# Patient Record
Sex: FEMALE | Race: WHITE | NOT HISPANIC OR LATINO | URBAN - METROPOLITAN AREA
[De-identification: names, ages, dates, MRNs, and addresses within clinical notes are randomized per-mention and may not be internally consistent; named-entity substitution may affect disease eponyms.]

---

## 2023-12-15 ENCOUNTER — OFFICE (OUTPATIENT)
Dept: URBAN - METROPOLITAN AREA CLINIC 27 | Facility: CLINIC | Age: 73
End: 2023-12-15

## 2023-12-15 VITALS
SYSTOLIC BLOOD PRESSURE: 133 MMHG | HEART RATE: 80 BPM | TEMPERATURE: 98.7 F | HEIGHT: 61 IN | WEIGHT: 142 LBS | DIASTOLIC BLOOD PRESSURE: 75 MMHG

## 2023-12-15 DIAGNOSIS — D50.9 IRON DEFICIENCY ANEMIA, UNSPECIFIED: ICD-10-CM

## 2023-12-15 PROCEDURE — 99214 OFFICE O/P EST MOD 30 MIN: CPT | Performed by: INTERNAL MEDICINE

## 2023-12-19 DIAGNOSIS — L20.89 OTHER ATOPIC DERMATITIS: Primary | ICD-10-CM

## 2023-12-19 RX ORDER — DUPILUMAB 300 MG/2ML
300 INJECTION, SOLUTION SUBCUTANEOUS
Qty: 4 ML | Refills: 0 | Status: SHIPPED | OUTPATIENT
Start: 2023-12-19 | End: 2023-12-21 | Stop reason: SDUPTHER

## 2023-12-19 RX ORDER — DUPILUMAB 300 MG/2ML
300 INJECTION, SOLUTION SUBCUTANEOUS
COMMUNITY
Start: 2021-01-29 | End: 2023-12-19 | Stop reason: SDUPTHER

## 2023-12-20 PROBLEM — L20.9 ATOPIC DERMATITIS: Status: ACTIVE | Noted: 2023-12-20

## 2023-12-21 ENCOUNTER — SPECIALTY PHARMACY (OUTPATIENT)
Dept: PHARMACY | Facility: CLINIC | Age: 73
End: 2023-12-21

## 2023-12-21 ENCOUNTER — OFFICE VISIT (OUTPATIENT)
Dept: ALLERGY | Facility: CLINIC | Age: 73
End: 2023-12-21
Payer: MEDICARE

## 2023-12-21 VITALS — HEART RATE: 77 BPM | OXYGEN SATURATION: 98 % | DIASTOLIC BLOOD PRESSURE: 68 MMHG | SYSTOLIC BLOOD PRESSURE: 116 MMHG

## 2023-12-21 DIAGNOSIS — L20.89 OTHER ATOPIC DERMATITIS: ICD-10-CM

## 2023-12-21 DIAGNOSIS — L20.9 ATOPIC DERMATITIS, UNSPECIFIED TYPE: Primary | ICD-10-CM

## 2023-12-21 PROCEDURE — 1036F TOBACCO NON-USER: CPT | Performed by: ALLERGY & IMMUNOLOGY

## 2023-12-21 PROCEDURE — 1159F MED LIST DOCD IN RCRD: CPT | Performed by: ALLERGY & IMMUNOLOGY

## 2023-12-21 PROCEDURE — 99214 OFFICE O/P EST MOD 30 MIN: CPT | Performed by: ALLERGY & IMMUNOLOGY

## 2023-12-21 PROCEDURE — 1160F RVW MEDS BY RX/DR IN RCRD: CPT | Performed by: ALLERGY & IMMUNOLOGY

## 2023-12-21 PROCEDURE — 1125F AMNT PAIN NOTED PAIN PRSNT: CPT | Performed by: ALLERGY & IMMUNOLOGY

## 2023-12-21 RX ORDER — BUDESONIDE 0.25 MG/2ML
INHALANT ORAL
COMMUNITY

## 2023-12-21 RX ORDER — AA/PROT/LYSINE/METHIO/VIT C/B6 50-12.5 MG
TABLET ORAL
COMMUNITY
Start: 2016-12-15

## 2023-12-21 RX ORDER — FERROUS SULFATE 325(65) MG
TABLET ORAL
COMMUNITY
Start: 2017-07-26

## 2023-12-21 RX ORDER — FUROSEMIDE 20 MG/1
TABLET ORAL
COMMUNITY
Start: 2016-12-01

## 2023-12-21 RX ORDER — METOPROLOL SUCCINATE 25 MG/1
TABLET, EXTENDED RELEASE ORAL
COMMUNITY
Start: 2023-01-03

## 2023-12-21 RX ORDER — MYCOPHENOLATE MOFETIL 500 MG/1
TABLET ORAL
COMMUNITY
Start: 2023-10-02 | End: 2024-06-06 | Stop reason: ALTCHOICE

## 2023-12-21 RX ORDER — LEVOTHYROXINE SODIUM 50 UG/1
50 TABLET ORAL
COMMUNITY
Start: 2017-08-10

## 2023-12-21 RX ORDER — MECLIZINE HCL 12.5 MG 12.5 MG/1
TABLET ORAL
COMMUNITY
Start: 2018-06-18

## 2023-12-21 RX ORDER — HYDROXYCHLOROQUINE SULFATE 200 MG/1
TABLET, FILM COATED ORAL EVERY 24 HOURS
COMMUNITY
Start: 2018-01-30

## 2023-12-21 RX ORDER — DESONIDE 0.5 MG/G
CREAM TOPICAL 2 TIMES DAILY
Qty: 60 G | Refills: 3 | Status: SHIPPED | OUTPATIENT
Start: 2023-12-21

## 2023-12-21 RX ORDER — NYSTATIN 100000 U/G
CREAM TOPICAL 2 TIMES DAILY
Qty: 30 G | Refills: 3 | Status: SHIPPED | OUTPATIENT
Start: 2023-12-21 | End: 2024-12-20

## 2023-12-21 RX ORDER — DESONIDE 0.5 MG/G
CREAM TOPICAL
COMMUNITY
Start: 2023-02-09 | End: 2023-12-21 | Stop reason: SDUPTHER

## 2023-12-21 RX ORDER — DUPILUMAB 300 MG/2ML
300 INJECTION, SOLUTION SUBCUTANEOUS
Qty: 6 EACH | Refills: 3 | Status: SHIPPED
Start: 2023-12-21 | End: 2024-06-06 | Stop reason: ALTCHOICE

## 2023-12-21 NOTE — PROGRESS NOTES
Subjective   Patient ID:   [id number]   Lisa Roberson is a 73 y.o. female who presents for Eczema.    Chief Complaint   Patient presents with    Eczema        Started Dupixent Tuesday, 4/21.    Since last visit, 02/09/2023, patient has been doing really. Her itching is dramatically improved with the dupixent. She still has mild outbreaks, but only slight itching. She started Cellcept about 3 weeks ago for her arthritis and she thinks it has already been helpful.     She is currently having a red itchy rash under her abdominal skin, right greater than left. It is irritating. She tried and OTC cream (doesn't recall the name) and it didn't help          Objective     /68 (BP Location: Left arm, Patient Position: Sitting, BP Cuff Size: Adult)   Pulse 77   SpO2 98%      Physical Exam  Constitutional:       Appearance: Normal appearance.   HENT:      Head: Normocephalic and atraumatic.      Right Ear: External ear normal. There is no impacted cerumen.      Left Ear: External ear normal. There is no impacted cerumen.      Nose: No rhinorrhea.   Eyes:      Extraocular Movements: Extraocular movements intact.      Conjunctiva/sclera: Conjunctivae normal.      Pupils: Pupils are equal, round, and reactive to light.   Cardiovascular:      Rate and Rhythm: Normal rate and regular rhythm.      Heart sounds: No murmur heard.     No friction rub. No gallop.   Pulmonary:      Effort: No respiratory distress.      Breath sounds: No wheezing, rhonchi or rales.   Musculoskeletal:         General: Deformity present.   Skin:     General: Skin is warm and dry.          Neurological:      Mental Status: She is alert.   Psychiatric:         Mood and Affect: Mood normal.         Behavior: Behavior normal.            Current Outpatient Medications   Medication Sig Dispense Refill    apixaban (Eliquis) 5 mg tablet Take 1 tablet (5 mg) by mouth.      budesonide (Pulmicort) 0.25 mg/2 mL nebulizer solution Inhale.      CALCIUM  26-VIT D3-MAGNESIUM 15 ORAL 600 mg.      coenzyme Q-10 (Co Q-10) 10 mg capsule ORAL, Date: 12/15/16 4:19:00 PM EST      ferrous sulfate, 325 mg ferrous sulfate, tablet 325 mg 1 tabs, ORAL, DAILY, 90 tabs, Date: 07/26/2017 14:04:00 EDT, Tablet      furosemide (Lasix) 20 mg tablet Take by mouth.      hydroxychloroquine (Plaquenil) 200 mg tablet once every 24 hours.      levothyroxine (Synthroid, Levoxyl) 50 mcg tablet Take 1 tablet (50 mcg) by mouth once daily.      meclizine (Antivert) 12.5 mg tablet Take by mouth.      metoprolol succinate XL (Toprol-XL) 25 mg 24 hr tablet       mycophenolate (Cellcept) 500 mg tablet 500mg once/day x1 week, then 500mg twice/day x1 wk, then 500mg every morning & 1000mg every evening x1wk, then 1000mg twice daily thereafter      desonide (DesOwen) 0.05 % cream Apply topically 2 times a day. 60 g 3    dupilumab (Dupixent Pen) 300 mg/2 mL injection Inject 2 mL (300 mg) under the skin every 14 (fourteen) days. 6 each 3    nystatin (Mycostatin) cream Apply topically 2 times a day. 30 g 3     No current facility-administered medications for this visit.         No orders of the defined types were placed in this encounter.         Assessment/Plan         Atopic dermatitis  She is doing very well with Dupixent. Dermatitis is under control. Mild flare ups so I will not decrease her frequency at this time. If the cellcept helps her dermatitis then I may try to spread it out.     Yeast dermatitis  She has what appears to be a yeast dermatitis in her groin. I will order nystatin/triamcinolone cream twice a day

## 2023-12-26 PROBLEM — B37.2 YEAST DERMATITIS: Status: ACTIVE | Noted: 2023-12-26

## 2023-12-26 NOTE — ASSESSMENT & PLAN NOTE
She is doing very well with Dupixent. Dermatitis is under control. Mild flare ups so I will not decrease her frequency at this time. If the cellcept helps her dermatitis then I may try to spread it out.

## 2023-12-26 NOTE — ASSESSMENT & PLAN NOTE
She has what appears to be a yeast dermatitis in her groin. I will order nystatin/triamcinolone cream twice a day

## 2024-02-06 ENCOUNTER — ON CAMPUS - OUTPATIENT (OUTPATIENT)
Dept: URBAN - METROPOLITAN AREA HOSPITAL 51 | Facility: HOSPITAL | Age: 74
End: 2024-02-06

## 2024-02-06 DIAGNOSIS — D12.2 BENIGN NEOPLASM OF ASCENDING COLON: ICD-10-CM

## 2024-02-06 DIAGNOSIS — K57.30 DIVERTICULOSIS OF LARGE INTESTINE WITHOUT PERFORATION OR ABS: ICD-10-CM

## 2024-02-06 DIAGNOSIS — D50.9 IRON DEFICIENCY ANEMIA, UNSPECIFIED: ICD-10-CM

## 2024-02-06 DIAGNOSIS — K64.8 OTHER HEMORRHOIDS: ICD-10-CM

## 2024-02-06 DIAGNOSIS — K55.20 ANGIODYSPLASIA OF COLON WITHOUT HEMORRHAGE: ICD-10-CM

## 2024-02-06 PROCEDURE — 45382 COLONOSCOPY W/CONTROL BLEED: CPT | Mod: 59 | Performed by: INTERNAL MEDICINE

## 2024-02-06 PROCEDURE — 45380 COLONOSCOPY AND BIOPSY: CPT | Performed by: INTERNAL MEDICINE

## 2024-05-12 ENCOUNTER — INPATIENT HOSPITAL (OUTPATIENT)
Dept: URBAN - METROPOLITAN AREA HOSPITAL 50 | Facility: HOSPITAL | Age: 74
End: 2024-05-12

## 2024-05-12 DIAGNOSIS — K57.30 DIVERTICULOSIS OF LARGE INTESTINE WITHOUT PERFORATION OR ABS: ICD-10-CM

## 2024-05-12 DIAGNOSIS — K81.0 ACUTE CHOLECYSTITIS: ICD-10-CM

## 2024-05-12 DIAGNOSIS — K83.8 OTHER SPECIFIED DISEASES OF BILIARY TRACT: ICD-10-CM

## 2024-05-12 PROCEDURE — 99222 1ST HOSP IP/OBS MODERATE 55: CPT | Performed by: INTERNAL MEDICINE

## 2024-05-13 ENCOUNTER — INPATIENT HOSPITAL (OUTPATIENT)
Dept: URBAN - METROPOLITAN AREA HOSPITAL 50 | Facility: HOSPITAL | Age: 74
End: 2024-05-13

## 2024-05-13 DIAGNOSIS — K57.30 DIVERTICULOSIS OF LARGE INTESTINE WITHOUT PERFORATION OR ABS: ICD-10-CM

## 2024-05-13 DIAGNOSIS — K83.8 OTHER SPECIFIED DISEASES OF BILIARY TRACT: ICD-10-CM

## 2024-05-13 DIAGNOSIS — K81.0 ACUTE CHOLECYSTITIS: ICD-10-CM

## 2024-05-13 DIAGNOSIS — D50.9 IRON DEFICIENCY ANEMIA, UNSPECIFIED: ICD-10-CM

## 2024-05-13 PROCEDURE — 99233 SBSQ HOSP IP/OBS HIGH 50: CPT | Mod: FS | Performed by: NURSE PRACTITIONER

## 2024-05-14 PROCEDURE — 99233 SBSQ HOSP IP/OBS HIGH 50: CPT | Mod: FS | Performed by: NURSE PRACTITIONER

## 2024-05-15 ENCOUNTER — INPATIENT HOSPITAL (OUTPATIENT)
Dept: URBAN - METROPOLITAN AREA HOSPITAL 50 | Facility: HOSPITAL | Age: 74
End: 2024-05-15

## 2024-05-15 DIAGNOSIS — K64.4 RESIDUAL HEMORRHOIDAL SKIN TAGS: ICD-10-CM

## 2024-05-15 DIAGNOSIS — D50.9 IRON DEFICIENCY ANEMIA, UNSPECIFIED: ICD-10-CM

## 2024-05-15 DIAGNOSIS — K81.0 ACUTE CHOLECYSTITIS: ICD-10-CM

## 2024-05-15 DIAGNOSIS — K55.20 ANGIODYSPLASIA OF COLON WITHOUT HEMORRHAGE: ICD-10-CM

## 2024-05-15 DIAGNOSIS — K83.8 OTHER SPECIFIED DISEASES OF BILIARY TRACT: ICD-10-CM

## 2024-05-15 DIAGNOSIS — K57.30 DIVERTICULOSIS OF LARGE INTESTINE WITHOUT PERFORATION OR ABS: ICD-10-CM

## 2024-05-15 DIAGNOSIS — K63.5 POLYP OF COLON: ICD-10-CM

## 2024-05-15 PROCEDURE — 99233 SBSQ HOSP IP/OBS HIGH 50: CPT | Mod: FS | Performed by: NURSE PRACTITIONER

## 2024-05-16 ENCOUNTER — INPATIENT HOSPITAL (OUTPATIENT)
Dept: URBAN - METROPOLITAN AREA HOSPITAL 50 | Facility: HOSPITAL | Age: 74
End: 2024-05-16

## 2024-05-16 DIAGNOSIS — K64.8 OTHER HEMORRHOIDS: ICD-10-CM

## 2024-05-16 DIAGNOSIS — K83.8 OTHER SPECIFIED DISEASES OF BILIARY TRACT: ICD-10-CM

## 2024-05-16 DIAGNOSIS — D50.9 IRON DEFICIENCY ANEMIA, UNSPECIFIED: ICD-10-CM

## 2024-05-16 DIAGNOSIS — K63.5 POLYP OF COLON: ICD-10-CM

## 2024-05-16 DIAGNOSIS — K81.0 ACUTE CHOLECYSTITIS: ICD-10-CM

## 2024-05-16 DIAGNOSIS — K57.30 DIVERTICULOSIS OF LARGE INTESTINE WITHOUT PERFORATION OR ABS: ICD-10-CM

## 2024-05-16 DIAGNOSIS — K55.20 ANGIODYSPLASIA OF COLON WITHOUT HEMORRHAGE: ICD-10-CM

## 2024-05-16 PROCEDURE — 99233 SBSQ HOSP IP/OBS HIGH 50: CPT | Mod: FS | Performed by: CLINICAL NURSE SPECIALIST

## 2024-06-05 NOTE — PROGRESS NOTES
Subjective   Patient ID:   [id number]   Lisa Roberson is a 73 y.o. female who presents for Follow-up.    Chief Complaint   Patient presents with    Follow-up      Started Dupixent April 2021.      Since last visit, 12-21-23, patient reports she is S/P cholecystectomy 5-24-24 and had to stop Dupixent.  She did well and has no issues.  She has not taken Dupixent since 4-3-24 and is doing well.  She is wondering if she can stay off it.  Cellcept was also stopped to avoid risk of infection and also has not taken it since.  She believes she will be prescribed an infusion for her RA instead but not sure what yet.    Patient is C/O spots on posterior legs she believes is consistent with scarring, but admits they are not bothersome.  There are spots on her posterior right leg, inner left thigh and spot on right buttock that always looks bruised and bright red when inflamed.  She has desonide cream at home and believes she has a refill.  She states the yeast dermatitis present in her groin has resolved with Nystatin.    Review of Systems   Skin:         Spots on posterior legs that appear to be scars.   Hematological:  Bruises/bleeds easily.     Objective     Wt 58.1 kg (128 lb)   BMI 24.19 kg/m²      Physical Exam  Constitutional:       Appearance: Normal appearance.   HENT:      Head: Normocephalic and atraumatic.      Right Ear: External ear normal.      Left Ear: External ear normal.   Eyes:      Extraocular Movements: Extraocular movements intact.      Conjunctiva/sclera: Conjunctivae normal.      Pupils: Pupils are equal, round, and reactive to light.   Cardiovascular:      Rate and Rhythm: Normal rate and regular rhythm.      Heart sounds: No murmur heard.     No friction rub. No gallop.   Pulmonary:      Effort: No respiratory distress.      Breath sounds: No wheezing, rhonchi or rales.   Musculoskeletal:      Comments: She has gross deformity from rheumatoid arthritis.   Skin:     General: Skin is warm and dry.       Findings: Bruising (spots on posterior right leg, inner left thigh and right buttock that appears to be bruising) present.   Neurological:      Mental Status: She is alert.   Psychiatric:         Mood and Affect: Mood normal.         Behavior: Behavior normal.     Assessment/Plan     Atopic dermatitis  At this point we will wait and see how she does. Hopefully she can remain off the Dupixent    She will continue her elocon as needed.       By signing my name below, I, Brenda Gonsalez, attest that this documentation has been prepared under the direction and in the presence of Ely Gamboa MD.  All medical record entries made by the Scribe were at my direction and personally dictated by me. I have reviewed the chart and agree that the record accurately reflects my personal performance of the history, physical exam, discussion and plan.

## 2024-06-06 ENCOUNTER — OFFICE VISIT (OUTPATIENT)
Dept: ALLERGY | Facility: CLINIC | Age: 74
End: 2024-06-06
Payer: MEDICARE

## 2024-06-06 VITALS — BODY MASS INDEX: 24.19 KG/M2 | WEIGHT: 128 LBS

## 2024-06-06 DIAGNOSIS — L20.9 ATOPIC DERMATITIS, UNSPECIFIED TYPE: ICD-10-CM

## 2024-06-06 PROCEDURE — 1159F MED LIST DOCD IN RCRD: CPT | Performed by: ALLERGY & IMMUNOLOGY

## 2024-06-06 PROCEDURE — 1160F RVW MEDS BY RX/DR IN RCRD: CPT | Performed by: ALLERGY & IMMUNOLOGY

## 2024-06-06 PROCEDURE — 99213 OFFICE O/P EST LOW 20 MIN: CPT | Performed by: ALLERGY & IMMUNOLOGY

## 2024-06-06 RX ORDER — ALBUTEROL SULFATE 0.83 MG/ML
2.5 SOLUTION RESPIRATORY (INHALATION)
COMMUNITY
Start: 2024-02-09

## 2024-06-06 RX ORDER — CYCLOSPORINE 0.5 MG/ML
1 EMULSION OPHTHALMIC 2 TIMES DAILY
COMMUNITY
Start: 2024-05-07

## 2024-06-06 ASSESSMENT — ENCOUNTER SYMPTOMS: BRUISES/BLEEDS EASILY: 1

## 2024-06-09 NOTE — ASSESSMENT & PLAN NOTE
At this point we will wait and see how she does. Hopefully she can remain off the Dupixent    She will continue her elocon as needed.